# Patient Record
Sex: MALE | Race: WHITE | NOT HISPANIC OR LATINO
[De-identification: names, ages, dates, MRNs, and addresses within clinical notes are randomized per-mention and may not be internally consistent; named-entity substitution may affect disease eponyms.]

---

## 2017-10-19 ENCOUNTER — APPOINTMENT (OUTPATIENT)
Dept: UROLOGY | Facility: CLINIC | Age: 52
End: 2017-10-19

## 2018-06-26 ENCOUNTER — APPOINTMENT (OUTPATIENT)
Dept: UROLOGY | Facility: CLINIC | Age: 53
End: 2018-06-26

## 2018-07-09 ENCOUNTER — APPOINTMENT (OUTPATIENT)
Dept: UROLOGY | Facility: CLINIC | Age: 53
End: 2018-07-09
Payer: COMMERCIAL

## 2018-07-09 VITALS
SYSTOLIC BLOOD PRESSURE: 110 MMHG | HEART RATE: 78 BPM | BODY MASS INDEX: 35 KG/M2 | WEIGHT: 250 LBS | OXYGEN SATURATION: 97 % | DIASTOLIC BLOOD PRESSURE: 69 MMHG | HEIGHT: 71 IN

## 2018-07-09 PROCEDURE — 99214 OFFICE O/P EST MOD 30 MIN: CPT

## 2018-07-09 RX ORDER — MOMETASONE FUROATE 1 MG/G
0.1 CREAM TOPICAL
Qty: 45 | Refills: 0 | Status: COMPLETED | COMMUNITY
Start: 2018-05-17

## 2018-07-09 RX ORDER — METHYLPREDNISOLONE 4 MG/1
4 TABLET ORAL
Qty: 21 | Refills: 0 | Status: COMPLETED | COMMUNITY
Start: 2018-05-17

## 2018-07-09 RX ORDER — AMOXICILLIN AND CLAVULANATE POTASSIUM 500; 125 MG/1; MG/1
500-125 TABLET, FILM COATED ORAL
Qty: 20 | Refills: 0 | Status: COMPLETED | COMMUNITY
Start: 2018-03-27

## 2018-07-09 RX ORDER — FLUTICASONE PROPIONATE 50 UG/1
50 SPRAY, METERED NASAL
Qty: 16 | Refills: 0 | Status: COMPLETED | COMMUNITY
Start: 2018-03-27

## 2019-05-13 ENCOUNTER — APPOINTMENT (OUTPATIENT)
Dept: UROLOGY | Facility: CLINIC | Age: 54
End: 2019-05-13

## 2019-05-17 ENCOUNTER — APPOINTMENT (OUTPATIENT)
Dept: UROLOGY | Facility: CLINIC | Age: 54
End: 2019-05-17
Payer: COMMERCIAL

## 2019-05-20 LAB
PSA FREE FLD-MCNC: 59 %
PSA FREE SERPL-MCNC: 0.53 NG/ML
PSA SERPL-MCNC: 0.91 NG/ML

## 2019-06-14 ENCOUNTER — APPOINTMENT (OUTPATIENT)
Dept: UROLOGY | Facility: CLINIC | Age: 54
End: 2019-06-14
Payer: COMMERCIAL

## 2019-06-14 VITALS
HEART RATE: 85 BPM | SYSTOLIC BLOOD PRESSURE: 132 MMHG | BODY MASS INDEX: 35.98 KG/M2 | TEMPERATURE: 97.7 F | HEIGHT: 71 IN | WEIGHT: 257 LBS | DIASTOLIC BLOOD PRESSURE: 82 MMHG

## 2019-06-14 PROCEDURE — 99213 OFFICE O/P EST LOW 20 MIN: CPT

## 2019-06-14 NOTE — PHYSICAL EXAM
[General Appearance - Well Developed] : well developed [Normal Appearance] : normal appearance [General Appearance - Well Nourished] : well nourished [Well Groomed] : well groomed [General Appearance - In No Acute Distress] : no acute distress [Abdomen Soft] : soft [Abdomen Tenderness] : non-tender [Costovertebral Angle Tenderness] : no ~M costovertebral angle tenderness [Penis Abnormality] : normal circumcised penis [Urethral Meatus] : meatus normal [Scrotum] : the scrotum was normal [Urinary Bladder Findings] : the bladder was normal on palpation [Testes Mass (___cm)] : there were no testicular masses [Testes Tenderness] : no tenderness of the testes [Prostate Tenderness] : the prostate was not tender [No Prostate Nodules] : no prostate nodules [Prostate Size ___ gm] : prostate size [unfilled] gm [Edema] : no peripheral edema [Exaggerated Use Of Accessory Muscles For Inspiration] : no accessory muscle use [] : no respiratory distress [Respiration, Rhythm And Depth] : normal respiratory rhythm and effort [Affect] : the affect was normal [Oriented To Time, Place, And Person] : oriented to person, place, and time [Mood] : the mood was normal [Not Anxious] : not anxious [Normal Station and Gait] : the gait and station were normal for the patient's age [No Focal Deficits] : no focal deficits [No Palpable Adenopathy] : no palpable adenopathy [FreeTextEntry1] : PFMs 3/4 (L>R) with +MTrPs b/l levators. b/l OI and transverse perineii not palpable due to pt pain and body habitus

## 2019-06-14 NOTE — HISTORY OF PRESENT ILLNESS
[FreeTextEntry1] : 53 year old man here for routine annual urologic exam. His only complaint is nocturia. This began years ago. He voids 1-2x/night. It is mild in severity and not bothersome. Nothing makes the symptoms better, drinking after dinner makes symptoms worse. \par It is associated with nothing.\par No hematuria, no dysuria, no frequency, no urgency, no hesitancy, no straining. No incontinence. \par No fevers, no chills, no nausea, no vomiting, no flank pain. Of note, father with prostate cancer though he  of cardiovascular disease and not prostate cancer\par \par 2019: Patient presents for follow up. He reports  symptoms and other medical  issues remain unchanged from above. Nocturia persists, 2x/night, but pt thinks he awakens and then voids because he is awake. Does not think urge to void is what wakes him. He is not bothered by this. No hematuria, no dysuria, no frequency, no urgency, no hesitancy, no straining. No incontinence. No fevers, no chills, no nausea, no vomiting, no flank pain. Oddly, today he says that his father did not have prostate cancer.

## 2019-06-14 NOTE — REVIEW OF SYSTEMS
[both] : pain during and after intercourse [denies] : denies pain with orgasm [base] : pain in base of penis [Negative] : Heme/Lymph [Wake up at night to urinate  How many times?  ___] : wakes up to urinate [unfilled] times during the night [Leakage of urine with urgency] : leakage of urine with urgency [Joint Pain] : joint pain

## 2019-06-14 NOTE — ASSESSMENT
[FreeTextEntry1] : 52 yo M with mild BPH, nocturia but not bothersome. PSA wnl. Recommend annual screening PSA.\par \par Patient also had PFM spasm on exam consistent with possible Pelvic Floor Dysfunction. We discussed that treatment for pelvic floor dysfunction include physical therapy and daily diazepam suppositories. Patient was offered referral to physical therapist knowledgeable in PFD and Rx for Valium suppositories but pt declined, given his lack of symptoms or bother. Pt can follow up in 1 year for annual exam or sooner PRN.

## 2020-06-12 ENCOUNTER — APPOINTMENT (OUTPATIENT)
Dept: UROLOGY | Facility: CLINIC | Age: 55
End: 2020-06-12

## 2020-06-29 ENCOUNTER — APPOINTMENT (OUTPATIENT)
Dept: UROLOGY | Facility: CLINIC | Age: 55
End: 2020-06-29
Payer: COMMERCIAL

## 2020-06-29 VITALS — TEMPERATURE: 97.3 F

## 2020-06-29 PROCEDURE — 99213 OFFICE O/P EST LOW 20 MIN: CPT

## 2020-07-22 NOTE — REVIEW OF SYSTEMS
[both] : pain during and after intercourse [base] : pain in base of penis [denies] : denies pain with orgasm [Wake up at night to urinate  How many times?  ___] : wakes up to urinate [unfilled] times during the night [Leakage of urine with urgency] : leakage of urine with urgency [Joint Pain] : joint pain [Negative] : Heme/Lymph

## 2020-07-22 NOTE — HISTORY OF PRESENT ILLNESS
[FreeTextEntry1] : 53 year old man here for routine annual urologic exam. His only complaint is nocturia. This began years ago. He voids 1-2x/night. It is mild in severity and not bothersome. Nothing makes the symptoms better, drinking after dinner makes symptoms worse. \par It is associated with nothing.\par No hematuria, no dysuria, no frequency, no urgency, no hesitancy, no straining. No incontinence. \par No fevers, no chills, no nausea, no vomiting, no flank pain. Of note, father with prostate cancer though he  of cardiovascular disease and not prostate cancer\par \par 2019: Patient presents for follow up. He reports  symptoms and other medical  issues remain unchanged from above. Nocturia persists, 2x/night, but pt thinks he awakens and then voids because he is awake. Does not think urge to void is what wakes him. He is not bothered by this. No hematuria, no dysuria, no frequency, no urgency, no hesitancy, no straining. No incontinence. No fevers, no chills, no nausea, no vomiting, no flank pain. Oddly, today he says that his father did not have prostate cancer. \par \par 2020: Patient presents for follow up. He reports  symptoms and other medical  issues remain unchanged from above. Mild nocturia, no bother.  No hematuria, no dysuria, no frequency, no urgency, no hesitancy, no straining. No incontinence. No fevers, no chills, no nausea, no vomiting, no flank pain. PSA 0.9 last year.

## 2020-07-22 NOTE — PHYSICAL EXAM
[General Appearance - Well Developed] : well developed [Normal Appearance] : normal appearance [General Appearance - Well Nourished] : well nourished [Well Groomed] : well groomed [General Appearance - In No Acute Distress] : no acute distress [Abdomen Soft] : soft [Costovertebral Angle Tenderness] : no ~M costovertebral angle tenderness [Abdomen Tenderness] : non-tender [Urethral Meatus] : meatus normal [Penis Abnormality] : normal circumcised penis [Scrotum] : the scrotum was normal [Urinary Bladder Findings] : the bladder was normal on palpation [Testes Tenderness] : no tenderness of the testes [Testes Mass (___cm)] : there were no testicular masses [Prostate Tenderness] : the prostate was not tender [No Prostate Nodules] : no prostate nodules [Prostate Size ___ gm] : prostate size [unfilled] gm [FreeTextEntry1] : PFMs 3/4 (L>R) with +MTrPs b/l levators. b/l OI and transverse perineii not palpable due to pt pain and body habitus [] : no respiratory distress [Edema] : no peripheral edema [Exaggerated Use Of Accessory Muscles For Inspiration] : no accessory muscle use [Respiration, Rhythm And Depth] : normal respiratory rhythm and effort [Oriented To Time, Place, And Person] : oriented to person, place, and time [Affect] : the affect was normal [Mood] : the mood was normal [Not Anxious] : not anxious [Normal Station and Gait] : the gait and station were normal for the patient's age [No Focal Deficits] : no focal deficits [No Palpable Adenopathy] : no palpable adenopathy

## 2021-09-13 ENCOUNTER — APPOINTMENT (OUTPATIENT)
Dept: UROLOGY | Facility: CLINIC | Age: 56
End: 2021-09-13
Payer: COMMERCIAL

## 2021-09-13 VITALS
WEIGHT: 225 LBS | SYSTOLIC BLOOD PRESSURE: 129 MMHG | HEIGHT: 71 IN | DIASTOLIC BLOOD PRESSURE: 78 MMHG | HEART RATE: 76 BPM | BODY MASS INDEX: 31.5 KG/M2 | TEMPERATURE: 97.6 F | RESPIRATION RATE: 16 BRPM

## 2021-09-13 PROCEDURE — 99213 OFFICE O/P EST LOW 20 MIN: CPT

## 2021-09-13 NOTE — HISTORY OF PRESENT ILLNESS
[FreeTextEntry1] : 53 year old man here for routine annual urologic exam. His only complaint is nocturia. This began years ago. He voids 1-2x/night. It is mild in severity and not bothersome. Nothing makes the symptoms better, drinking after dinner makes symptoms worse. \par It is associated with nothing.\par No hematuria, no dysuria, no frequency, no urgency, no hesitancy, no straining. No incontinence. \par No fevers, no chills, no nausea, no vomiting, no flank pain. Of note, father with prostate cancer though he  of cardiovascular disease and not prostate cancer\par \par 2019: Patient presents for follow up. He reports  symptoms and other medical  issues remain unchanged from above. Nocturia persists, 2x/night, but pt thinks he awakens and then voids because he is awake. Does not think urge to void is what wakes him. He is not bothered by this. No hematuria, no dysuria, no frequency, no urgency, no hesitancy, no straining. No incontinence. No fevers, no chills, no nausea, no vomiting, no flank pain. Oddly, today he says that his father did not have prostate cancer. \par \par 2020: Patient presents for follow up. He reports  symptoms and other medical  issues remain unchanged from above. Mild nocturia, no bother.  No hematuria, no dysuria, no frequency, no urgency, no hesitancy, no straining. No incontinence. No fevers, no chills, no nausea, no vomiting, no flank pain. PSA 0.9 last year.\par \par 2021: Patient presents for follow up. He reports  symptoms and other medical  issues remain unchanged from above. Mild nocturia, no bother.  No hematuria, no dysuria, no frequency, no urgency, no hesitancy, no straining. No incontinence. No fevers, no chills, no nausea, no vomiting, no flank pain. PSA 0.8.

## 2021-09-13 NOTE — ASSESSMENT
[FreeTextEntry1] : 56 yo M with mild BPH, nocturia but not bothersome. PSA wnl. Recommend annual screening PSA.\par \par Patient also had PFM spasm on exam consistent with possible Pelvic Floor Dysfunction. We discussed that treatment for pelvic floor dysfunction include physical therapy and daily diazepam suppositories. Patient was offered referral to physical therapist knowledgeable in PFD and Rx for Valium suppositories but pt declined, given his lack of symptoms or bother. Pt can follow up in 1 year for annual exam or sooner PRN.

## 2021-09-13 NOTE — REVIEW OF SYSTEMS
[Negative] : Heme/Lymph [both] : pain during and after intercourse [denies] : denies pain with orgasm [base] : pain in base of penis [Wake up at night to urinate  How many times?  ___] : wakes up to urinate [unfilled] times during the night [Leakage of urine with urgency] : leakage of urine with urgency [Joint Pain] : joint pain

## 2021-09-13 NOTE — PHYSICAL EXAM
[General Appearance - Well Developed] : well developed [General Appearance - Well Nourished] : well nourished [Normal Appearance] : normal appearance [Well Groomed] : well groomed [General Appearance - In No Acute Distress] : no acute distress [Abdomen Soft] : soft [Abdomen Tenderness] : non-tender [Costovertebral Angle Tenderness] : no ~M costovertebral angle tenderness [Urethral Meatus] : meatus normal [Penis Abnormality] : normal circumcised penis [Urinary Bladder Findings] : the bladder was normal on palpation [Scrotum] : the scrotum was normal [Testes Tenderness] : no tenderness of the testes [Testes Mass (___cm)] : there were no testicular masses [Prostate Tenderness] : the prostate was not tender [No Prostate Nodules] : no prostate nodules [Prostate Size ___ gm] : prostate size [unfilled] gm [FreeTextEntry1] : PFMs 3/4 (L>R) with +MTrPs b/l levators. b/l OI and transverse perineii not palpable due to pt pain and body habitus [Edema] : no peripheral edema [] : no respiratory distress [Respiration, Rhythm And Depth] : normal respiratory rhythm and effort [Exaggerated Use Of Accessory Muscles For Inspiration] : no accessory muscle use [Oriented To Time, Place, And Person] : oriented to person, place, and time [Affect] : the affect was normal [Mood] : the mood was normal [Not Anxious] : not anxious [Normal Station and Gait] : the gait and station were normal for the patient's age [No Focal Deficits] : no focal deficits [No Palpable Adenopathy] : no palpable adenopathy

## 2022-02-04 ENCOUNTER — TRANSCRIPTION ENCOUNTER (OUTPATIENT)
Age: 57
End: 2022-02-04

## 2022-04-23 ENCOUNTER — TRANSCRIPTION ENCOUNTER (OUTPATIENT)
Age: 57
End: 2022-04-23

## 2022-09-12 ENCOUNTER — APPOINTMENT (OUTPATIENT)
Dept: UROLOGY | Facility: CLINIC | Age: 57
End: 2022-09-12

## 2022-10-10 ENCOUNTER — APPOINTMENT (OUTPATIENT)
Dept: UROLOGY | Facility: CLINIC | Age: 57
End: 2022-10-10

## 2023-01-09 ENCOUNTER — APPOINTMENT (OUTPATIENT)
Dept: UROLOGY | Facility: CLINIC | Age: 58
End: 2023-01-09
Payer: COMMERCIAL

## 2023-01-09 VITALS
WEIGHT: 240 LBS | HEART RATE: 60 BPM | RESPIRATION RATE: 16 BRPM | HEIGHT: 71 IN | DIASTOLIC BLOOD PRESSURE: 91 MMHG | TEMPERATURE: 97.9 F | BODY MASS INDEX: 33.6 KG/M2 | SYSTOLIC BLOOD PRESSURE: 137 MMHG

## 2023-01-09 DIAGNOSIS — Z12.5 ENCOUNTER FOR SCREENING FOR MALIGNANT NEOPLASM OF PROSTATE: ICD-10-CM

## 2023-01-09 PROCEDURE — 99213 OFFICE O/P EST LOW 20 MIN: CPT

## 2023-01-09 NOTE — ASSESSMENT
[FreeTextEntry1] : 58 yo M with mild BPH, nocturia. Discussed that it may be due to ARELY. If he is found to have ARELY, he will comply with treatment recommendations and see if this improves nocturia. If no ARELY found, will do FVC.\par \par Patient also had PFM spasm on exam consistent with possible Pelvic Floor Dysfunction. We discussed that treatment for pelvic floor dysfunction include physical therapy and daily diazepam suppositories. Patient was offered referral to physical therapist knowledgeable in PFD and Rx for Valium suppositories but pt declined medication. He is interested in PFPT. He will RTO after 6 weeks of PT.

## 2023-01-09 NOTE — HISTORY OF PRESENT ILLNESS
[FreeTextEntry1] : 53 year old man here for routine annual urologic exam. His only complaint is nocturia. This began years ago. He voids 1-2x/night. It is mild in severity and not bothersome. Nothing makes the symptoms better, drinking after dinner makes symptoms worse. \par It is associated with nothing.\par No hematuria, no dysuria, no frequency, no urgency, no hesitancy, no straining. No incontinence. \par No fevers, no chills, no nausea, no vomiting, no flank pain. Of note, father with prostate cancer though he  of cardiovascular disease and not prostate cancer\par \par 2019: Patient presents for follow up. He reports  symptoms and other medical  issues remain unchanged from above. Nocturia persists, 2x/night, but pt thinks he awakens and then voids because he is awake. Does not think urge to void is what wakes him. He is not bothered by this. No hematuria, no dysuria, no frequency, no urgency, no hesitancy, no straining. No incontinence. No fevers, no chills, no nausea, no vomiting, no flank pain. Oddly, today he says that his father did not have prostate cancer. \par \par 2020: Patient presents for follow up. He reports  symptoms and other medical  issues remain unchanged from above. Mild nocturia, no bother.  No hematuria, no dysuria, no frequency, no urgency, no hesitancy, no straining. No incontinence. No fevers, no chills, no nausea, no vomiting, no flank pain. PSA 0.9 last year.\par \par 2021: Patient presents for follow up. He reports  symptoms and other medical  issues remain unchanged from above. Mild nocturia, no bother.  No hematuria, no dysuria, no frequency, no urgency, no hesitancy, no straining. No incontinence. No fevers, no chills, no nausea, no vomiting, no flank pain. PSA 0.8. \par \par 2023: Patient presents for follow up. He reports nocturia now awakens him at 4am and then 1-2x thereafter, so 2-3x/night. he is seeing sleep specialist for r/o ARELY. Does report some decreased mobility in pelvis and occasional perineal pain. PSA 1.3

## 2023-01-09 NOTE — REVIEW OF SYSTEMS
[both] : pain during and after intercourse [denies] : denies pain with orgasm [base] : pain in base of penis [Wake up at night to urinate  How many times?  ___] : wakes up to urinate [unfilled] times during the night [Leakage of urine with urgency] : leakage of urine with urgency [Joint Pain] : joint pain [Negative] : Heme/Lymph

## 2023-01-09 NOTE — PHYSICAL EXAM
[General Appearance - Well Developed] : well developed [General Appearance - Well Nourished] : well nourished [Normal Appearance] : normal appearance [Well Groomed] : well groomed [General Appearance - In No Acute Distress] : no acute distress [Abdomen Soft] : soft [Abdomen Tenderness] : non-tender [Costovertebral Angle Tenderness] : no ~M costovertebral angle tenderness [Urethral Meatus] : meatus normal [Penis Abnormality] : normal circumcised penis [Urinary Bladder Findings] : the bladder was normal on palpation [Scrotum] : the scrotum was normal [Testes Tenderness] : no tenderness of the testes [Testes Mass (___cm)] : there were no testicular masses [Prostate Tenderness] : the prostate was not tender [No Prostate Nodules] : no prostate nodules [Prostate Size ___ gm] : prostate size [unfilled] gm [FreeTextEntry1] : hypertonic levators with tenderness [Edema] : no peripheral edema [] : no respiratory distress [Respiration, Rhythm And Depth] : normal respiratory rhythm and effort [Exaggerated Use Of Accessory Muscles For Inspiration] : no accessory muscle use [Oriented To Time, Place, And Person] : oriented to person, place, and time [Affect] : the affect was normal [Mood] : the mood was normal [Not Anxious] : not anxious [Normal Station and Gait] : the gait and station were normal for the patient's age [No Focal Deficits] : no focal deficits [No Palpable Adenopathy] : no palpable adenopathy

## 2023-01-10 ENCOUNTER — APPOINTMENT (OUTPATIENT)
Dept: PULMONOLOGY | Facility: CLINIC | Age: 58
End: 2023-01-10
Payer: COMMERCIAL

## 2023-01-10 ENCOUNTER — NON-APPOINTMENT (OUTPATIENT)
Age: 58
End: 2023-01-10

## 2023-01-10 VITALS — WEIGHT: 240 LBS | BODY MASS INDEX: 33.47 KG/M2

## 2023-01-10 VITALS — RESPIRATION RATE: 16 BRPM | HEART RATE: 77 BPM | OXYGEN SATURATION: 97 %

## 2023-01-10 DIAGNOSIS — E66.9 OBESITY, UNSPECIFIED: ICD-10-CM

## 2023-01-10 DIAGNOSIS — G47.33 OBSTRUCTIVE SLEEP APNEA (ADULT) (PEDIATRIC): ICD-10-CM

## 2023-01-10 PROCEDURE — 99203 OFFICE O/P NEW LOW 30 MIN: CPT

## 2023-01-10 RX ORDER — ASPIRIN 81 MG/1
81 TABLET, CHEWABLE ORAL
Refills: 0 | Status: ACTIVE | COMMUNITY
Start: 2022-11-10

## 2023-01-10 RX ORDER — AZITHROMYCIN 250 MG/1
250 TABLET, FILM COATED ORAL
Qty: 6 | Refills: 0 | Status: COMPLETED | COMMUNITY
Start: 2022-09-09

## 2023-01-10 RX ORDER — ERGOCALCIFEROL 1.25 MG/1
1.25 MG CAPSULE, LIQUID FILLED ORAL
Refills: 0 | Status: ACTIVE | COMMUNITY
Start: 2022-11-10

## 2023-01-10 RX ORDER — OMEPRAZOLE 20 MG/1
20 CAPSULE, DELAYED RELEASE ORAL
Qty: 30 | Refills: 0 | Status: COMPLETED | COMMUNITY
Start: 2022-10-04

## 2023-01-10 RX ORDER — FOLIC ACID 1 MG/1
1 TABLET ORAL
Refills: 0 | Status: ACTIVE | COMMUNITY
Start: 2022-11-10

## 2023-01-10 RX ORDER — ROSUVASTATIN CALCIUM 5 MG/1
5 TABLET, FILM COATED ORAL
Refills: 0 | Status: ACTIVE | COMMUNITY
Start: 2022-11-10

## 2023-01-10 NOTE — CONSULT LETTER
[Dear  ___] : Dear  [unfilled], [Consult Letter:] : I had the pleasure of evaluating your patient, [unfilled]. [Please see my note below.] : Please see my note below. [Consult Closing:] : Thank you very much for allowing me to participate in the care of this patient.  If you have any questions, please do not hesitate to contact me. [Sincerely,] : Sincerely, [DrMery  ___] : Dr. LUTZ

## 2023-01-10 NOTE — PHYSICAL EXAM
[Elongated Uvula] : elongated uvula [Enlarged Base of the Tongue] : enlarged base of the tongue [I] : Mallampati Class: I [Normal Appearance] : normal appearance [Neck Circumference: ___] : neck circumference: [unfilled] [No Neck Mass] : no neck mass [Normal Rate/Rhythm] : normal rate/rhythm [Normal S1, S2] : normal s1, s2 [No Resp Distress] : no resp distress [Clear to Auscultation Bilaterally] : clear to auscultation bilaterally

## 2023-02-22 ENCOUNTER — NON-APPOINTMENT (OUTPATIENT)
Age: 58
End: 2023-02-22

## 2023-03-02 NOTE — HISTORY OF PRESENT ILLNESS
[Obstructive Sleep Apnea] : obstructive sleep apnea [Awakes Unrefreshed] : awakes unrefreshed [Awakes with Dry Mouth] : awakes with dry mouth [Daytime Somnolence] : daytime somnolence [Nonrestorative Sleep] : nonrestorative sleep [Recent  Weight Gain] : recent weight gain [Snoring] : snoring [TextBox_4] : Tired all the time\par Wants to R/O ARELY  [ESS] : 10

## 2023-03-02 NOTE — DISCUSSION/SUMMARY
[FreeTextEntry1] : Assess\par \par Obesity\par ARELY\par \par Plan\par \par Weight loss\par WP HST\par One month FU

## 2023-03-03 ENCOUNTER — APPOINTMENT (OUTPATIENT)
Dept: PULMONOLOGY | Facility: CLINIC | Age: 58
End: 2023-03-03

## 2023-08-28 ENCOUNTER — APPOINTMENT (OUTPATIENT)
Dept: ORTHOPEDIC SURGERY | Facility: CLINIC | Age: 58
End: 2023-08-28

## 2023-09-06 ENCOUNTER — APPOINTMENT (OUTPATIENT)
Dept: ORTHOPEDIC SURGERY | Facility: CLINIC | Age: 58
End: 2023-09-06
Payer: COMMERCIAL

## 2023-09-06 VITALS — BODY MASS INDEX: 32.9 KG/M2 | HEIGHT: 71 IN | WEIGHT: 235 LBS

## 2023-09-06 DIAGNOSIS — E78.00 PURE HYPERCHOLESTEROLEMIA, UNSPECIFIED: ICD-10-CM

## 2023-09-06 DIAGNOSIS — M48.061 SPINAL STENOSIS, LUMBAR REGION WITHOUT NEUROGENIC CLAUDICATION: ICD-10-CM

## 2023-09-06 DIAGNOSIS — M51.36 OTHER INTERVERTEBRAL DISC DEGENERATION, LUMBAR REGION: ICD-10-CM

## 2023-09-06 DIAGNOSIS — M54.51 VERTEBROGENIC LOW BACK PAIN: ICD-10-CM

## 2023-09-06 PROCEDURE — 99203 OFFICE O/P NEW LOW 30 MIN: CPT

## 2023-09-06 NOTE — HISTORY OF PRESENT ILLNESS
[Gradual] : gradual [2] : 2 [Radiating] : radiating [Full time] : Work status: full time [de-identified] : 09/06/2023 - Patient presents to the office for initial evaluation of chronic lower back pain. Patient doing well today and in the recent weeks, but he wants to establish care 2/2 occasional flareups.  During the flares he is unable to do much of anything, he treats with HEP, elevating his extremities, and OTC NSAIDs prn. 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  His chronic lower back pain is worsened with prolonged sitting and forward flexion.  He has no complaints at today's visit.  Patient denies fevers, acute weakness, unexpected weight loss, urinary incontinence, and bowel incontinence.   Subjective Weakness: No  Numbness/Tingling: no Bladder/Bowel dysfunction: no  Gait Abnormalities: no Fine motor coordination changes: no      Injections: No    Pertinent Surgical History: N/A     [] : no [FreeTextEntry5] : chronic lower back pain many yrs ago as a  had pain then. pain has been intermittent over the years [FreeTextEntry7] : b/l hips [FreeTextEntry9] : standing, walking, raising legs upward [de-identified] : sit/stand motion, traveling [de-identified] : orthopedic, chiropractor, acupuncture [de-identified] : had an mri many yrs ago unsure of where [de-identified] :

## 2023-09-06 NOTE — DATA REVIEWED
[MRI] : MRI [Lumbar Spine] : lumbar spine [Report was reviewed and noted in the chart] : The report was reviewed and noted in the chart [FreeTextEntry1] : PATIENT NAME: Hank Roman DATE OF EXAM: 11/13/2019 MRI lumbar spine zp rad Vertebral body heights: Maintained.  Alignment: Normal. Marrow signal: No acute fracture or marrow replacing lesion is seen Spinal canal: The conus terminates at L1 and is unremarkable Paravertebral soft tissues: Unremarkable. Discs: There is degenerative decreased T2 disc signal at L3-4 and L4-5 L1-2: There is no significant posterior disc abnormality, spinal canal or foraminal stenosis. L2-3: There is no significant posterior disc abnormality, spinal canal or foraminal stenosis. L3-4: There is diffuse disc bulging without central canal stenosis. There is moderate foraminal narrowing L4-5: There is diffuse disc bulging impinging the descending bilateral L5 nerve roots with moderate foraminal narrowing L5-S1: There is diffuse disc bulging without central canal stenosis. There is mild left moderate right foraminal narrowing

## 2023-09-06 NOTE — PHYSICAL EXAM
[de-identified] : Constitutional:   - No acute distress   - Well developed; well nourished      Neurological:   - normal mood and affect   - alert and oriented x 3       Cardiovascular:   - grossly normal     Lumbar Spine Exam:   Inspection:  erythema (-)  ecchymosis (-)  rashes (-)  alignment: no scoliosis     Palpation:  Midline lumbar tenderness:             (-)  midline thoracic tenderness:          (-)  Lumbar paraspinal tenderness:  	L (-) ; R (-)  thoracic paraspinal tenderness:	L (-) ; R (-)  sciatic nerve tenderness :         	L (-) ; R (-)  SI joint tenderness:                    	L (-) ; R (-)  GTB tenderness:                        	L (-);  R (-)     ROM:    full with stiffness     Strength:                                 	Right   	Left     Hip Flexion:                (5/5)       (5/5)  Quadriceps:               (5/5)       (5/5)  Hamstrings:                (5/5)       (5/5)  Ankle Dorsiflexion:    (5/5)       (5/5)  EHL:                            (5/5)       (5/5)  Ankle Plantarflexion:  (5/5)       (5/5)     Special Tests:  SLR:                        	R (-) ; L (-)  Facet loading:        	R (-) ; L (-)  BERTHA test:            	R (-) ; L (-)  Hamstring tightness:  R (-);  L (-)     Neurologic:  SILT throughout right lower extremity  SILT throughout left lower extremity     Reflexes normal and symmetric bilateral lower extremities     Gait:  non- antalgic gait  ambulates without assistive device

## 2023-09-06 NOTE — ASSESSMENT
[FreeTextEntry1] : This is a 58-year-old male with chronic history of lower back pain in the absence of neuropathic pain or deficits.  Plan - obtain new MRI lumbar spine and FUV with Dr. Conner - c/s with Dr. Conner to discuss candidacy for Intracept procedures with Modic changes at L4-S1 on 2019 MRI lumbar spine. - Discussed mainstay of tx would be PT/HEP participation, OTC nsaids, RICE. - Defers to use HEP over outpatient PT - FUV PRN with spine service as he has no neuropathic pain, deficits, or indications for surgery at this time   Prior to appointment and during encounter with patient extensive medical records were reviewed including but not limited to, hospital records, outpatient records, imaging results, and lab data. During this appointment the patient was examined, diagnoses were discussed and explained in a face to face manner. In addition extensive time was spent reviewing aforementioned diagnostic studies. Counseling including abnormal image results, differential diagnoses, treatment options, risk and benefits, lifestyle changes, current condition, and current medications was performed. Patient's comments, questions, and concerns were addressed and patient verbalized understanding. Based on this patient's presentation at our office, which is an orthopedic spine surgeon's office, this patient inherently / intrinsically has a risk, however minute, of developing issues such as Cauda equina syndrome, bowel and bladder changes, or progression of motor or neurological deficits such as paralysis which may be permanent.  Dimas CORNEJO NP, acting as scribe, attest that this documentation has been prepared under the direction and in the presence of the provider Khris Somers MD.

## 2024-02-05 ENCOUNTER — APPOINTMENT (OUTPATIENT)
Dept: UROLOGY | Facility: CLINIC | Age: 59
End: 2024-02-05

## 2024-02-12 ENCOUNTER — APPOINTMENT (OUTPATIENT)
Dept: UROLOGY | Facility: CLINIC | Age: 59
End: 2024-02-12
Payer: COMMERCIAL

## 2024-02-12 VITALS
WEIGHT: 227 LBS | HEART RATE: 72 BPM | BODY MASS INDEX: 31.78 KG/M2 | DIASTOLIC BLOOD PRESSURE: 81 MMHG | SYSTOLIC BLOOD PRESSURE: 117 MMHG | HEIGHT: 71 IN

## 2024-02-12 DIAGNOSIS — M62.89 OTHER SPECIFIED DISORDERS OF MUSCLE: ICD-10-CM

## 2024-02-12 DIAGNOSIS — N40.0 BENIGN PROSTATIC HYPERPLASIA WITHOUT LOWER URINARY TRACT SYMPMS: ICD-10-CM

## 2024-02-12 PROCEDURE — 99212 OFFICE O/P EST SF 10 MIN: CPT

## 2024-02-12 NOTE — ASSESSMENT
[FreeTextEntry1] : 56 yo M with mild BPH, nocturia. Not bothresome. WIll observe.   Patient also had PFM spasm on exam consistent with possible Pelvic Floor Dysfunction. We discussed that treatment for pelvic floor dysfunction include physical therapy and daily diazepam suppositories. Patient was offered referral to physical therapist knowledgeable in PFD and Rx for Valium suppositories but pt declined medication. He is interested in PFPT. but wants to think about it.   [facility]

## 2024-02-12 NOTE — HISTORY OF PRESENT ILLNESS
[FreeTextEntry1] : 53 year old man here for routine annual urologic exam. His only complaint is nocturia. This began years ago. He voids 1-2x/night. It is mild in severity and not bothersome. Nothing makes the symptoms better, drinking after dinner makes symptoms worse.  It is associated with nothing. No hematuria, no dysuria, no frequency, no urgency, no hesitancy, no straining. No incontinence.  No fevers, no chills, no nausea, no vomiting, no flank pain. Of note, father with prostate cancer though he  of cardiovascular disease and not prostate cancer  2019: Patient presents for follow up. He reports  symptoms and other medical  issues remain unchanged from above. Nocturia persists, 2x/night, but pt thinks he awakens and then voids because he is awake. Does not think urge to void is what wakes him. He is not bothered by this. No hematuria, no dysuria, no frequency, no urgency, no hesitancy, no straining. No incontinence. No fevers, no chills, no nausea, no vomiting, no flank pain. Oddly, today he says that his father did not have prostate cancer.   2020: Patient presents for follow up. He reports  symptoms and other medical  issues remain unchanged from above. Mild nocturia, no bother.  No hematuria, no dysuria, no frequency, no urgency, no hesitancy, no straining. No incontinence. No fevers, no chills, no nausea, no vomiting, no flank pain. PSA 0.9 last year.  2021: Patient presents for follow up. He reports  symptoms and other medical  issues remain unchanged from above. Mild nocturia, no bother.  No hematuria, no dysuria, no frequency, no urgency, no hesitancy, no straining. No incontinence. No fevers, no chills, no nausea, no vomiting, no flank pain. PSA 0.8.   2023: Patient presents for follow up. He reports nocturia now awakens him at 4am and then 1-2x thereafter, so 2-3x/night. he is seeing sleep specialist for r/o ARELY. Does report some decreased mobility in pelvis and occasional perineal pain. PSA 1.3  2024: Patient presents for follow up. He reports nocturia 1-2x/night, not bothersome. No daytime symptoms. Back pain, hip pain, but no pelvic pain. PSA with PCP 1.4.

## 2025-02-10 ENCOUNTER — APPOINTMENT (OUTPATIENT)
Dept: UROLOGY | Facility: CLINIC | Age: 60
End: 2025-02-10

## 2025-03-07 ENCOUNTER — APPOINTMENT (OUTPATIENT)
Dept: UROLOGY | Facility: CLINIC | Age: 60
End: 2025-03-07
Payer: COMMERCIAL

## 2025-03-07 VITALS
DIASTOLIC BLOOD PRESSURE: 75 MMHG | WEIGHT: 230 LBS | HEART RATE: 67 BPM | BODY MASS INDEX: 32.08 KG/M2 | SYSTOLIC BLOOD PRESSURE: 117 MMHG

## 2025-03-07 DIAGNOSIS — M62.89 OTHER SPECIFIED DISORDERS OF MUSCLE: ICD-10-CM

## 2025-03-07 DIAGNOSIS — R35.1 NOCTURIA: ICD-10-CM

## 2025-03-07 DIAGNOSIS — N40.0 BENIGN PROSTATIC HYPERPLASIA WITHOUT LOWER URINARY TRACT SYMPMS: ICD-10-CM

## 2025-03-07 PROCEDURE — 99213 OFFICE O/P EST LOW 20 MIN: CPT
